# Patient Record
Sex: FEMALE | Race: WHITE
[De-identification: names, ages, dates, MRNs, and addresses within clinical notes are randomized per-mention and may not be internally consistent; named-entity substitution may affect disease eponyms.]

---

## 2018-04-26 ENCOUNTER — HOSPITAL ENCOUNTER (INPATIENT)
Dept: HOSPITAL 54 - ER | Age: 78
LOS: 3 days | Discharge: HOME | DRG: 391 | End: 2018-04-29
Attending: NURSE PRACTITIONER
Payer: MEDICARE

## 2018-04-26 VITALS — WEIGHT: 158 LBS | BODY MASS INDEX: 25.39 KG/M2 | HEIGHT: 66 IN

## 2018-04-26 DIAGNOSIS — K21.9: ICD-10-CM

## 2018-04-26 DIAGNOSIS — K29.70: Primary | ICD-10-CM

## 2018-04-26 DIAGNOSIS — I10: ICD-10-CM

## 2018-04-26 DIAGNOSIS — K76.89: ICD-10-CM

## 2018-04-26 DIAGNOSIS — F41.9: ICD-10-CM

## 2018-04-26 DIAGNOSIS — E83.42: ICD-10-CM

## 2018-04-26 DIAGNOSIS — M51.35: ICD-10-CM
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
## 2018-04-26 DIAGNOSIS — E03.9: ICD-10-CM

## 2018-04-26 DIAGNOSIS — T39.315A: ICD-10-CM

## 2018-04-26 DIAGNOSIS — K57.30: ICD-10-CM

## 2018-04-26 DIAGNOSIS — E87.1: ICD-10-CM

## 2018-04-26 DIAGNOSIS — N17.0: ICD-10-CM

## 2018-04-26 LAB
ALBUMIN SERPL BCP-MCNC: 3.8 G/DL (ref 3.4–5)
ALP SERPL-CCNC: 39 U/L (ref 46–116)
ALT SERPL W P-5'-P-CCNC: 24 U/L (ref 12–78)
APPEARANCE UR: CLEAR
APTT PPP: 21 SEC (ref 23–34)
AST SERPL W P-5'-P-CCNC: 22 U/L (ref 15–37)
BASOPHILS # BLD AUTO: 0 /CMM (ref 0–0.2)
BASOPHILS NFR BLD AUTO: 0.5 % (ref 0–2)
BILIRUB DIRECT SERPL-MCNC: 0.1 MG/DL (ref 0–0.2)
BILIRUB SERPL-MCNC: 0.5 MG/DL (ref 0.2–1)
BILIRUB UR QL STRIP: NEGATIVE
BUN SERPL-MCNC: 20 MG/DL (ref 7–18)
CALCIUM SERPL-MCNC: 9.7 MG/DL (ref 8.5–10.1)
CHLORIDE SERPL-SCNC: 91 MMOL/L (ref 98–107)
CO2 SERPL-SCNC: 22 MMOL/L (ref 21–32)
COLOR UR: YELLOW
CREAT SERPL-MCNC: 1.1 MG/DL (ref 0.6–1.3)
EOSINOPHIL NFR BLD AUTO: 1.2 % (ref 0–6)
GLUCOSE SERPL-MCNC: 125 MG/DL (ref 74–106)
GLUCOSE UR STRIP-MCNC: NEGATIVE MG/DL
HCT VFR BLD AUTO: 35 % (ref 33–45)
HGB BLD-MCNC: 12.3 G/DL (ref 11.5–14.8)
HGB UR QL STRIP: (no result) ERY/UL
INR PPP: 0.95 (ref 0.85–1.15)
KETONES UR STRIP-MCNC: NEGATIVE MG/DL
LEUKOCYTE ESTERASE UR QL STRIP: (no result)
LIPASE SERPL-CCNC: 218 U/L (ref 73–393)
LYMPHOCYTES NFR BLD AUTO: 1.9 /CMM (ref 0.8–4.8)
LYMPHOCYTES NFR BLD AUTO: 25.1 % (ref 20–44)
MCHC RBC AUTO-ENTMCNC: 36 G/DL (ref 31–36)
MCV RBC AUTO: 79 FL (ref 82–100)
MONOCYTES NFR BLD AUTO: 0.7 /CMM (ref 0.1–1.3)
MONOCYTES NFR BLD AUTO: 8.7 % (ref 2–12)
NEUTROPHILS # BLD AUTO: 4.8 /CMM (ref 1.8–8.9)
NEUTROPHILS NFR BLD AUTO: 64.5 % (ref 43–81)
NITRITE UR QL STRIP: NEGATIVE
PH UR STRIP: 7 [PH] (ref 5–8)
PLATELET # BLD AUTO: 203 /CMM (ref 150–450)
POTASSIUM SERPL-SCNC: 4.4 MMOL/L (ref 3.5–5.1)
PROT SERPL-MCNC: 7.6 G/DL (ref 6.4–8.2)
PROT UR QL STRIP: NEGATIVE MG/DL
RBC # BLD AUTO: 4.38 MIL/UL (ref 4–5.2)
RBC #/AREA URNS HPF: (no result) /HPF (ref 0–2)
RDW COEFFICIENT OF VARIATION: 13.1 (ref 11.5–15)
SODIUM SERPL-SCNC: 124 MMOL/L (ref 136–145)
UROBILINOGEN UR STRIP-MCNC: 0.2 EU/DL
WBC #/AREA URNS HPF: (no result) /HPF (ref 0–3)
WBC NRBC COR # BLD AUTO: 7.5 K/UL (ref 4.3–11)

## 2018-04-26 PROCEDURE — Z7610: HCPCS

## 2018-04-26 PROCEDURE — A4216 STERILE WATER/SALINE, 10 ML: HCPCS

## 2018-04-26 PROCEDURE — A4606 OXYGEN PROBE USED W OXIMETER: HCPCS

## 2018-04-26 PROCEDURE — C9113 INJ PANTOPRAZOLE SODIUM, VIA: HCPCS

## 2018-04-26 NOTE — NUR
PT IS ASSIGNED TO MED SURG RM#: [id number], PT IS DIAGNOSED WITH INTRACABLE 
ABDOMINAL PAIN, AND DR GILLETTE IS THE ACCEPTING MD.

## 2018-04-26 NOTE — NUR
RECEIVED REPORT FROM PRIMARY NURSE GUNNER PT STATES "I FEEL BETTER". NO S/S OF 
DISTRESS NOTED. RESP EVEN AND UNLABORED.

## 2018-04-26 NOTE — NUR
"ABD PAIN/N/V X1 WEEK". NAD NOTED, VSS, RESP EVEN AND UNLABORED, PT WAS PUT ON 
MONITOR, WAITING FOR MD LO.

## 2018-04-27 VITALS — SYSTOLIC BLOOD PRESSURE: 110 MMHG | DIASTOLIC BLOOD PRESSURE: 63 MMHG

## 2018-04-27 VITALS — SYSTOLIC BLOOD PRESSURE: 142 MMHG | DIASTOLIC BLOOD PRESSURE: 105 MMHG

## 2018-04-27 VITALS — DIASTOLIC BLOOD PRESSURE: 69 MMHG | SYSTOLIC BLOOD PRESSURE: 132 MMHG

## 2018-04-27 VITALS — DIASTOLIC BLOOD PRESSURE: 63 MMHG | SYSTOLIC BLOOD PRESSURE: 116 MMHG

## 2018-04-27 VITALS — DIASTOLIC BLOOD PRESSURE: 63 MMHG | SYSTOLIC BLOOD PRESSURE: 133 MMHG

## 2018-04-27 VITALS — SYSTOLIC BLOOD PRESSURE: 119 MMHG | DIASTOLIC BLOOD PRESSURE: 69 MMHG

## 2018-04-27 LAB
ALBUMIN SERPL BCP-MCNC: 3.6 G/DL (ref 3.4–5)
ALP SERPL-CCNC: 46 U/L (ref 46–116)
ALT SERPL W P-5'-P-CCNC: 30 U/L (ref 12–78)
AST SERPL W P-5'-P-CCNC: 24 U/L (ref 15–37)
BASOPHILS # BLD AUTO: 0 /CMM (ref 0–0.2)
BASOPHILS NFR BLD AUTO: 0.3 % (ref 0–2)
BILIRUB SERPL-MCNC: 0.6 MG/DL (ref 0.2–1)
BUN SERPL-MCNC: 15 MG/DL (ref 7–18)
CALCIUM SERPL-MCNC: 9.7 MG/DL (ref 8.5–10.1)
CHLORIDE SERPL-SCNC: 94 MMOL/L (ref 98–107)
CHOLEST SERPL-MCNC: 156 MG/DL (ref ?–200)
CO2 SERPL-SCNC: 22 MMOL/L (ref 21–32)
CREAT SERPL-MCNC: 1.1 MG/DL (ref 0.6–1.3)
EOSINOPHIL NFR BLD AUTO: 0.7 % (ref 0–6)
GLUCOSE SERPL-MCNC: 131 MG/DL (ref 74–106)
HCT VFR BLD AUTO: 36 % (ref 33–45)
HDLC SERPL-MCNC: 60 MG/DL (ref 40–60)
HGB BLD-MCNC: 12.4 G/DL (ref 11.5–14.8)
IRON SERPL-MCNC: 62 UG/DL (ref 50–175)
LDLC SERPL DIRECT ASSAY-MCNC: 86 MG/DL (ref 0–99)
LYMPHOCYTES NFR BLD AUTO: 1.7 /CMM (ref 0.8–4.8)
LYMPHOCYTES NFR BLD AUTO: 17.9 % (ref 20–44)
MAGNESIUM SERPL-MCNC: 1.1 MG/DL (ref 1.8–2.4)
MCHC RBC AUTO-ENTMCNC: 34 G/DL (ref 31–36)
MCV RBC AUTO: 81 FL (ref 82–100)
MONOCYTES NFR BLD AUTO: 0.5 /CMM (ref 0.1–1.3)
MONOCYTES NFR BLD AUTO: 5.8 % (ref 2–12)
NEUTROPHILS # BLD AUTO: 7 /CMM (ref 1.8–8.9)
NEUTROPHILS NFR BLD AUTO: 75.3 % (ref 43–81)
NT-PROBNP SERPL-MCNC: 277 PG/ML (ref 0–125)
PHOSPHATE SERPL-MCNC: 3.4 MG/DL (ref 2.5–4.9)
PLATELET # BLD AUTO: 225 /CMM (ref 150–450)
POTASSIUM SERPL-SCNC: 3.8 MMOL/L (ref 3.5–5.1)
PROT SERPL-MCNC: 7 G/DL (ref 6.4–8.2)
RBC # BLD AUTO: 4.46 MIL/UL (ref 4–5.2)
RDW COEFFICIENT OF VARIATION: 13.1 (ref 11.5–15)
SODIUM SERPL-SCNC: 126 MMOL/L (ref 136–145)
TIBC SERPL-MCNC: 250 UG/DL (ref 250–450)
TRIGL SERPL-MCNC: 125 MG/DL (ref 30–150)
TROPONIN I SERPL-MCNC: < 0.017 NG/ML (ref 0–0.06)
TSH SERPL DL<=0.005 MIU/L-ACNC: 2.7 UIU/ML (ref 0.36–3.74)
WBC NRBC COR # BLD AUTO: 9.3 K/UL (ref 4.3–11)

## 2018-04-27 RX ADMIN — METOPROLOL TARTRATE SCH MG: 25 TABLET, FILM COATED ORAL at 21:00

## 2018-04-27 RX ADMIN — MAGNESIUM SULFATE IN DEXTROSE SCH MLS/HR: 10 INJECTION, SOLUTION INTRAVENOUS at 20:53

## 2018-04-27 RX ADMIN — MAGNESIUM SULFATE IN DEXTROSE SCH MLS/HR: 10 INJECTION, SOLUTION INTRAVENOUS at 18:24

## 2018-04-27 RX ADMIN — MAGNESIUM SULFATE IN DEXTROSE SCH MLS/HR: 10 INJECTION, SOLUTION INTRAVENOUS at 23:01

## 2018-04-27 RX ADMIN — GABAPENTIN SCH MG: 100 CAPSULE ORAL at 12:58

## 2018-04-27 RX ADMIN — Medication SCH MG: at 20:53

## 2018-04-27 RX ADMIN — MAGNESIUM SULFATE IN DEXTROSE SCH MLS/HR: 10 INJECTION, SOLUTION INTRAVENOUS at 19:21

## 2018-04-27 RX ADMIN — SODIUM CHLORIDE SCH MG: 9 INJECTION, SOLUTION INTRAVENOUS at 12:58

## 2018-04-27 RX ADMIN — LINAGLIPTIN SCH MG: 5 TABLET, FILM COATED ORAL at 08:52

## 2018-04-27 RX ADMIN — METOPROLOL TARTRATE SCH MG: 25 TABLET, FILM COATED ORAL at 08:53

## 2018-04-27 RX ADMIN — GABAPENTIN SCH MG: 100 CAPSULE ORAL at 08:52

## 2018-04-27 RX ADMIN — SIMVASTATIN SCH MG: 40 TABLET, FILM COATED ORAL at 21:12

## 2018-04-27 RX ADMIN — GABAPENTIN SCH MG: 100 CAPSULE ORAL at 18:22

## 2018-04-27 RX ADMIN — MIRTAZAPINE SCH MG: 15 TABLET, FILM COATED ORAL at 21:12

## 2018-04-27 RX ADMIN — LEVOTHYROXINE SODIUM SCH MCG: 88 TABLET ORAL at 07:34

## 2018-04-27 NOTE — NUR
PT TO BE TRANPORTED TO FLOOR IN STABLE CONDITION. NAD NOTED. RESP EVEN AND 
UNLABORED. FAMILY WITH PT.

## 2018-04-27 NOTE — NUR
MS RN CLOSING NOTE

 PATIENT IS A/O X3, Turkmen SPEAKING. PATIENT AWAKE AND RESPONSIVE IN BED. BED IS LOCKED, 
IN LOWEST POSITION, SITE RAILS UP X2. AMBULATORY WITH ASSIST AND ORIENTED TO OWN ABILITIES. 
DENIES PAIN/DISCOMFORT. CHEST RAISING EQUALLY/BILATERALLY. EXTERNAL CARDIAC MONITOR READING 
SR68. SPO2 98% ON ROOM AIR. CALL LIGHT WITHIN REACH. EDUCATED TO CALL FOR ASSISTANCE USING 
THE CALL LIGHT. VERBALIZED UNDERSTANDING. WILL ENDORSE TO THE NIGHT SHIFT NURSE FOR BOLA.

## 2018-04-27 NOTE — NUR
TELE RN NOTES



PT IN BED ASLEEP AND EASILY AWAKEN, A/O X 4, NOT IN DISTRESS, AM CARE PROVIDED. 2LPM VIA NC 
02 SAT 98% STABLE CONDITION. KEPT CLEAN AND DRY AND COMFORT. NURSING CARE RENDERED. NEEDS 
ATTENDED AND ANTICIPATED.  ON LOW BED TO ENSURE SAFETY, CALL LIGHT WITHIN REACH, WILL 
ENDORSE TO THE NEXT SHIFT CONTINUE PLAN OF CARE. SR 66'S

## 2018-04-27 NOTE — NUR
RN OPENING NOTES



RECEIVED REPORT FROM DAYSHIFT RN MYRA. FOUND Pt AWAKE, RESTING IN BED. RESPIRATIONS EVEN 
AND UNLABORED. NO S/S OF ACUTE DISTRESS OR SOB NOTED. FAMILY VISITING AT BEDSIDE. Pt IS 
A/OX3, Japanese SPEAKING. IV ACCESS ON LHAND #20G, SL & LFA #22G, SL. SAFETY MEASURES IN 
PLACE. BED LOW, LOCKED, HOB ELEVATED, SIDE RAILS UP, CALL LIGHT AND BEDSIDE TABLE WITHIN 
REACH. WILL CONTINUE TO MONITOR Pt THROUGHOUT THE NIGHT FOR SAFETY.

## 2018-04-27 NOTE — NUR
TELE RN NOTES



RECEIVE PT FROM [facility] AT 0050 VIA [facility] ADMIT TO TELE SR 66 HEAD TO TOE ASSESSMENT 
IS DONE SKIN IS INTACT.  NO ACTIVE VOMITING NO COMPLAIN OF CHEST PAIN, PT ON 2LPM VIA NC 02 
SAT AT 97%  NO S/S OF DISTRESS, STABLE, SAFETY MEASURES IN PLACE, CALL LIGHT WITHIN REACH, 
WILL CONTINUE TO MONITOR

## 2018-04-27 NOTE — NUR
PRESENTS WITH SEVERE ANXIETY ATTACK AEB DIAPHORESIS, SENSATION OF IMPENDING DOOM, SOB AT 
REST. CALLED JAIRO ORANTES AND OBTAINED A TELEPHONE ORDER FOR 1MG ATIVAN IV ONE TIME NOW. ORDER 
READ BACK AND VERIFIED. ADMINISTERED MEDICATION AS ORDERED.

## 2018-04-27 NOTE — NUR
TELE RN OEPNING NOTE

RECEIVED BEDSIDE SBAR REPORT ON THE PATIENT. PATIENT IS A/O X3, Qatari SPEAKING. PATIENT 
AWAKE AND RESPONSIVE IN BED. BED IS LOCKED, IN LOWEST POSITION, SITE RAILS UP X2. AMBULATORY 
WITH ASSIST AND ORIENTED TO OWN ABILITIES. DENIES PAIN/DISCOMFORT. CHEST RAISING 
EQUALLY/BILATERALLY. EXTERNAL CARDIAC MONITOR READING SR68. SPO2 98% ON 2L OXYGEN. CALL 
LIGHT WITHIN REACH. EDUCATED TO CALL FOR ASSISTANCE USING THE CALL LIGHT. VERBALIZED 
UNDERSTANDING. WILL CONTINUE TO ASSESS/MONITOR THROUGHOUT THE SHIFT.

## 2018-04-27 NOTE — NUR
PATIENT IS CURRENTLY RESTING IN BED ASLEEP, EASILY AWAKEN. REPORT ALLEVIATION OF ANXIETY. 
FAMILY AT THE BEDSIDE.

## 2018-04-28 VITALS — SYSTOLIC BLOOD PRESSURE: 114 MMHG | DIASTOLIC BLOOD PRESSURE: 68 MMHG

## 2018-04-28 VITALS — SYSTOLIC BLOOD PRESSURE: 113 MMHG | DIASTOLIC BLOOD PRESSURE: 74 MMHG

## 2018-04-28 VITALS — DIASTOLIC BLOOD PRESSURE: 60 MMHG | SYSTOLIC BLOOD PRESSURE: 101 MMHG

## 2018-04-28 VITALS — DIASTOLIC BLOOD PRESSURE: 60 MMHG | SYSTOLIC BLOOD PRESSURE: 110 MMHG

## 2018-04-28 LAB
BUN SERPL-MCNC: 13 MG/DL (ref 7–18)
CALCIUM SERPL-MCNC: 8.8 MG/DL (ref 8.5–10.1)
CHLORIDE SERPL-SCNC: 97 MMOL/L (ref 98–107)
CO2 SERPL-SCNC: 29 MMOL/L (ref 21–32)
CREAT SERPL-MCNC: 1.1 MG/DL (ref 0.6–1.3)
GLUCOSE SERPL-MCNC: 107 MG/DL (ref 74–106)
MAGNESIUM SERPL-MCNC: 2.3 MG/DL (ref 1.8–2.4)
OSMOLALITY UR: 372 MOS/KG (ref 340–1090)
PHOSPHATE SERPL-MCNC: 4 MG/DL (ref 2.5–4.9)
POTASSIUM SERPL-SCNC: 4.3 MMOL/L (ref 3.5–5.1)
SODIUM SERPL-SCNC: 131 MMOL/L (ref 136–145)
SODIUM UR-SCNC: 24 MMOL/L (ref 40–220)
TSH SERPL DL<=0.005 MIU/L-ACNC: 1.17 UIU/ML (ref 0.36–3.74)
URATE SERPL-MCNC: 3.8 MG/DL (ref 2.6–7.2)

## 2018-04-28 PROCEDURE — 0DB68ZX EXCISION OF STOMACH, VIA NATURAL OR ARTIFICIAL OPENING ENDOSCOPIC, DIAGNOSTIC: ICD-10-PCS

## 2018-04-28 RX ADMIN — Medication SCH MG: at 21:25

## 2018-04-28 RX ADMIN — MIRTAZAPINE SCH MG: 15 TABLET, FILM COATED ORAL at 21:25

## 2018-04-28 RX ADMIN — Medication SCH MG: at 08:25

## 2018-04-28 RX ADMIN — GABAPENTIN SCH MG: 100 CAPSULE ORAL at 08:25

## 2018-04-28 RX ADMIN — METOPROLOL TARTRATE SCH MG: 25 TABLET, FILM COATED ORAL at 08:26

## 2018-04-28 RX ADMIN — GABAPENTIN SCH MG: 100 CAPSULE ORAL at 21:25

## 2018-04-28 RX ADMIN — ACETAMINOPHEN PRN MG: 325 TABLET ORAL at 19:03

## 2018-04-28 RX ADMIN — METOPROLOL TARTRATE SCH MG: 25 TABLET, FILM COATED ORAL at 21:26

## 2018-04-28 RX ADMIN — LINAGLIPTIN SCH MG: 5 TABLET, FILM COATED ORAL at 08:25

## 2018-04-28 RX ADMIN — GABAPENTIN SCH MG: 100 CAPSULE ORAL at 14:48

## 2018-04-28 RX ADMIN — SODIUM CHLORIDE SCH MG: 9 INJECTION, SOLUTION INTRAVENOUS at 14:48

## 2018-04-28 RX ADMIN — SIMVASTATIN SCH MG: 40 TABLET, FILM COATED ORAL at 21:25

## 2018-04-28 RX ADMIN — LEVOTHYROXINE SODIUM SCH MCG: 88 TABLET ORAL at 08:24

## 2018-04-28 NOTE — NUR
MS RN CLOSING NOTE

 PATIENT IS A/O X3 AWAKE AND RESPONSIVE IN BED. BED IS LOCKED, IN LOWEST POSITION, SITE 
RAILS UP X2. AMBULATORY WITH ASSIST AND ORIENTED TO OWN ABILITIES. DENIES PAIN/DISCOMFORT. 
DENIES NAUSEA. TOLERATED CLEAR LIQUID DIET WELL. CHEST RAISING EQUALLY/BILATERALLY.  SPO2 
98% ON ROOM AIR. CALL LIGHT WITHIN REACH. EDUCATED TO CALL FOR ASSISTANCE USING THE CALL 
LIGHT. VERBALIZED UNDERSTANDING.  FAMILY AT THE BEDSIDE. WILL ENDORSE TO NIGHT SHIFT RN TO 
ADMINISTER NEURONTIN AND XANAX AT 2100 PER PATIENT'S REQUEST. WILL ENDORSE TO THE NIGHT 
SHIFT NURSE FOR BOLA.

## 2018-04-28 NOTE — NUR
PATIENT STATED SHE WANTS TO TAKE HER NEURONTIN AND XANAX AT 9PM. WILL ENDORSE TO THE NIGHT 
SHIFT NURSE.

## 2018-04-28 NOTE — NUR
MS RN OPENING NOTE

RECEIVED BEDSIDE SBAR REPORT ON THE PATIENT. PATIENT IS A/O X3, Urdu/ENGLISH SPEAKING. 
PATIENT AWAKE AND RESPONSIVE IN BED. BED IS LOCKED, IN LOWEST POSITION, SITE RAILS UP X2. 
AMBULATORY WITH ASSIST AND ORIENTED TO OWN ABILITIES. DENIES PAIN/DISCOMFORT. CHEST RAISING 
EQUALLY/BILATERALLY.  SPO2 96% ON ROOM AIR. CALL LIGHT WITHIN REACH. EDUCATED TO CALL FOR 
ASSISTANCE USING THE CALL LIGHT. VERBALIZED UNDERSTANDING.  URINE SAMPLE COLLECTED. LABELED 
AS APPROPRIATE AND PLACED IN THE REFRIGERATOR.  LAB NOTIFIED TO .  WILL CONTINUE TO 
ASSESS/MONITOR THE PATIENT THROUGHOUT THE SHIFT.

## 2018-04-28 NOTE — NUR
RN NOTES



WAS ENDORSED BY TYLER RENTERIA THAT PER Pt's REQUEST WANTS TO TAKE HER SCHEDULED 1700 
NEURONTIN AND XANAX AT 2100 INSTEAD BEFORE SHE GOES TO BED. 

-------------------------------------------------------------------------------

Addendum: 04/28/18 at 2048 by MELISSA SAMAYOA RN

-------------------------------------------------------------------------------

Pt ALSO HAD EGD DONE TODAY. DIET ADVANCED TO CLEAR LIQUID DIET, ADVANCE AS TOLERATED. PER Pt 
WAS ABLE TO TOLERATE THE CLEAR LIQUID FINE. POSSIBLE DC TOMORROW.

## 2018-04-28 NOTE — NUR
PATIENT IS BACK FROM EGD IS STABLE CONDITION. ORDER RECEIVED FROM DR. ALDANA TO START CLEAR 
LIQUIDS AND ADVANCE AS TOLERATED.

## 2018-04-28 NOTE — NUR
PATIENT IS NPO SINCE DINNER. WAITING FOR SPARKLE SIFUENTES TO INFORM IF THE EGD WILL BE DONE TODAY. 
DISCUSSED PLAN OF CARE WITH PT AT THE BEDSIDE.

## 2018-04-28 NOTE — NUR
RN CLOSING NOTES



NO SIGNIFICANT CHANGES IN Pt's CONDITION. Pt REMAINS STABLE AT THIS TIME. NO S/S OF ACUTE 
DISTRESS OR SOB NOTED. RESPIRATIONS EVEN AND UNLABORED. ALL NEEDS MET AND ATTENDED TO. 
SAFETY MEASURES IN PLACE. WILL ENDORSE TO DAYSHIFT RN FOR Pt's BOLA.

## 2018-04-28 NOTE — NUR
PATIENT LEFT THE UNIT IN STABLE CONDITION GOING TO OR FOR EGD. CONSENT/CHECKLIST DONE. 
DAUGHTER AT THE BEDSIDE. NOTIFIED.

## 2018-04-28 NOTE — NUR
RN OPENING NOTES



RECEIVED REPORT FROM DAYSHIFT RN MYRA. FOUND Pt AWAKE, RESTING IN BED, WATCHING TV. NO 
S/S OF ACUTE DISTRESS OR SOB NOTED. NO C/O PAIN AT THIS TIME. Pt IS A/OX4, VERBAL, ABLE TO 
MAKE NEEDS KNOWN. IV ACCESS ON LFA #22G, SL. SAFETY MEASURES IN PLACE. BED LOW, LOCKED, HOB 
ELEVATED, SIDE RAILS UP, CALL LIGHT AND BEDSIDE TABLE WITHIN REACH. WILL CONTINUE TO MONITOR 
Pt THROUGHOUT THE NIGHT FOR SAFETY.

## 2018-04-29 VITALS — DIASTOLIC BLOOD PRESSURE: 76 MMHG | SYSTOLIC BLOOD PRESSURE: 137 MMHG

## 2018-04-29 VITALS — DIASTOLIC BLOOD PRESSURE: 73 MMHG | SYSTOLIC BLOOD PRESSURE: 137 MMHG

## 2018-04-29 LAB
BASOPHILS # BLD AUTO: 0 /CMM (ref 0–0.2)
BASOPHILS NFR BLD AUTO: 0.7 % (ref 0–2)
BUN SERPL-MCNC: 12 MG/DL (ref 7–18)
CALCIUM SERPL-MCNC: 8.5 MG/DL (ref 8.5–10.1)
CHLORIDE SERPL-SCNC: 100 MMOL/L (ref 98–107)
CO2 SERPL-SCNC: 27 MMOL/L (ref 21–32)
CREAT SERPL-MCNC: 1.1 MG/DL (ref 0.6–1.3)
EOSINOPHIL NFR BLD AUTO: 1.8 % (ref 0–6)
GLUCOSE SERPL-MCNC: 170 MG/DL (ref 74–106)
HCT VFR BLD AUTO: 34 % (ref 33–45)
HGB BLD-MCNC: 11.4 G/DL (ref 11.5–14.8)
LYMPHOCYTES NFR BLD AUTO: 1.1 /CMM (ref 0.8–4.8)
LYMPHOCYTES NFR BLD AUTO: 16.2 % (ref 20–44)
MCHC RBC AUTO-ENTMCNC: 34 G/DL (ref 31–36)
MCV RBC AUTO: 81 FL (ref 82–100)
MONOCYTES NFR BLD AUTO: 0.5 /CMM (ref 0.1–1.3)
MONOCYTES NFR BLD AUTO: 6.9 % (ref 2–12)
NEUTROPHILS # BLD AUTO: 4.9 /CMM (ref 1.8–8.9)
NEUTROPHILS NFR BLD AUTO: 74.4 % (ref 43–81)
PLATELET # BLD AUTO: 208 /CMM (ref 150–450)
POTASSIUM SERPL-SCNC: 3.9 MMOL/L (ref 3.5–5.1)
RBC # BLD AUTO: 4.16 MIL/UL (ref 4–5.2)
RDW COEFFICIENT OF VARIATION: 14.7 (ref 11.5–15)
SODIUM SERPL-SCNC: 134 MMOL/L (ref 136–145)
WBC NRBC COR # BLD AUTO: 6.6 K/UL (ref 4.3–11)

## 2018-04-29 RX ADMIN — GABAPENTIN SCH MG: 100 CAPSULE ORAL at 08:40

## 2018-04-29 RX ADMIN — ACETAMINOPHEN PRN MG: 325 TABLET ORAL at 14:11

## 2018-04-29 RX ADMIN — LEVOTHYROXINE SODIUM SCH MCG: 88 TABLET ORAL at 08:11

## 2018-04-29 RX ADMIN — METOPROLOL TARTRATE SCH MG: 25 TABLET, FILM COATED ORAL at 08:40

## 2018-04-29 RX ADMIN — GABAPENTIN SCH MG: 100 CAPSULE ORAL at 14:11

## 2018-04-29 RX ADMIN — LINAGLIPTIN SCH MG: 5 TABLET, FILM COATED ORAL at 08:41

## 2018-04-29 RX ADMIN — Medication SCH MG: at 08:40

## 2018-04-29 RX ADMIN — SODIUM CHLORIDE SCH MG: 9 INJECTION, SOLUTION INTRAVENOUS at 14:12

## 2018-04-29 NOTE — NUR
DISCHARGE ORDER RECEIVED. DISCHARGE EDUCATION PROVIDED AT THE BEDSIDE. ALL BELONGINGS ARE 
ACCOUNTED FOR. BELONGINGS LIST IS SIGNED. PATIENT PROVIDED WITH THE NOTE INDICATING FOLLOW 
UP APPOINTMENT SCHEDULED FOR [facility] LOCATED AT [address] [place] [address] ON MAY 1, 2018 AT 1330. 

PATIENT/FAMILY VERBALIZED UNDERSTANDING. IV CATHETER REMOVED WITH THE TIP INTACT. OCCLUSIVE 
DRESSING APPLIED. PATIENT TOLERATED PROCEDURE WELL. DISCHARGE PAPER PROVIDED TO THE PATIENT. 
CALLED STEPHEN, THE SON TO PICK THE PATIENT UP. CURRENTLY DENIES PAIN/DISCOMFORT. AMBULATORY. 
PRESENTS WITH INTACT SKIN. SPO2 96% ON RA. VS WNL.

## 2018-04-29 NOTE — NUR
MS RN OPENING NOTE

RECEIVED BEDSIDE SBAR REPORT ON THE PATIENT. PATIENT IS A/O X3, Sinhala/ENGLISH SPEAKING. 
PATIENT AWAKE AND RESPONSIVE IN BED. BED IS LOCKED, IN LOWEST POSITION, SITE RAILS UP X2. 
AMBULATORY WITH ASSIST AND ORIENTED TO OWN ABILITIES. DENIES PAIN/DISCOMFORT. REPORTS 
ADEQUATE SLEEP/REST.  CHEST RAISING EQUALLY/BILATERALLY.  SPO2 97% ON ROOM AIR. CALL LIGHT 
WITHIN REACH. EDUCATED TO CALL FOR ASSISTANCE USING THE CALL LIGHT. VERBALIZED 
UNDERSTANDING.    WILL CONTINUE TO ASSESS/MONITOR THE PATIENT THROUGHOUT THE SHIFT.

## 2018-04-29 NOTE — NUR
RN CLOSING NOTES



NO SIGNIFICANT CHANGES IN Pt's CONDITION. Pt REMAINS STABLE AT THIS TIME. NO S/S OF ACUTE 
DISTRESS OR SOB NOTED DURING THE NIGHT. RESPIRATIONS EVEN AND UNLABORED. ALL NEEDS MET AND 
ATTENDED TO. SAFETY MEASURES IN PLACE. WILL ENDORSE TO DAYSHIFT RN FOR Pt's BOLA. POSSIBLE DC 
TODAY.

## 2018-05-01 ENCOUNTER — HOSPITAL ENCOUNTER (OUTPATIENT)
Dept: HOSPITAL 54 - MSC | Age: 78
Discharge: HOME | End: 2018-05-01
Attending: INTERNAL MEDICINE
Payer: MEDICARE

## 2018-05-01 VITALS — SYSTOLIC BLOOD PRESSURE: 131 MMHG | DIASTOLIC BLOOD PRESSURE: 59 MMHG

## 2018-05-01 DIAGNOSIS — M19.90: ICD-10-CM

## 2018-05-01 DIAGNOSIS — Z09: Primary | ICD-10-CM

## 2018-05-01 DIAGNOSIS — G89.29: ICD-10-CM

## 2018-05-01 DIAGNOSIS — Z79.899: ICD-10-CM

## 2018-05-01 DIAGNOSIS — M54.9: ICD-10-CM

## 2018-05-01 DIAGNOSIS — I10: ICD-10-CM
